# Patient Record
Sex: MALE | Race: WHITE | Employment: FULL TIME | URBAN - METROPOLITAN AREA
[De-identification: names, ages, dates, MRNs, and addresses within clinical notes are randomized per-mention and may not be internally consistent; named-entity substitution may affect disease eponyms.]

---

## 2022-02-26 ENCOUNTER — HOSPITAL ENCOUNTER (EMERGENCY)
Age: 32
Discharge: HOME | End: 2022-02-26
Attending: EMERGENCY MEDICINE
Payer: OTHER GOVERNMENT

## 2022-02-26 VITALS
RESPIRATION RATE: 14 BRPM | TEMPERATURE: 98.6 F | OXYGEN SATURATION: 96 % | SYSTOLIC BLOOD PRESSURE: 139 MMHG | DIASTOLIC BLOOD PRESSURE: 95 MMHG | BODY MASS INDEX: 30.31 KG/M2 | WEIGHT: 200 LBS | HEART RATE: 118 BPM | HEIGHT: 68 IN

## 2022-02-26 DIAGNOSIS — N34.2 URETHRITIS: ICD-10-CM

## 2022-02-26 DIAGNOSIS — Z20.2 POTENTIAL EXPOSURE TO STD: Primary | ICD-10-CM

## 2022-02-26 PROCEDURE — 87491 CHLMYD TRACH DNA AMP PROBE: CPT

## 2022-02-26 PROCEDURE — 96372 THER/PROPH/DIAG INJ SC/IM: CPT

## 2022-02-26 PROCEDURE — 87591 N.GONORRHOEAE DNA AMP PROB: CPT

## 2022-02-26 PROCEDURE — 6360000002 HC RX W HCPCS: Performed by: EMERGENCY MEDICINE

## 2022-02-26 PROCEDURE — 6370000000 HC RX 637 (ALT 250 FOR IP): Performed by: EMERGENCY MEDICINE

## 2022-02-26 PROCEDURE — 99284 EMERGENCY DEPT VISIT MOD MDM: CPT

## 2022-02-26 RX ORDER — CEFTRIAXONE SODIUM 250 MG/1
250 INJECTION, POWDER, FOR SOLUTION INTRAMUSCULAR; INTRAVENOUS ONCE
Status: COMPLETED | OUTPATIENT
Start: 2022-02-26 | End: 2022-02-26

## 2022-02-26 RX ORDER — AZITHROMYCIN 250 MG/1
1000 TABLET, FILM COATED ORAL ONCE
Status: COMPLETED | OUTPATIENT
Start: 2022-02-26 | End: 2022-02-26

## 2022-02-26 RX ADMIN — AZITHROMYCIN 1000 MG: 250 TABLET, FILM COATED ORAL at 15:09

## 2022-02-26 RX ADMIN — CEFTRIAXONE SODIUM 250 MG: 250 INJECTION, POWDER, FOR SOLUTION INTRAMUSCULAR; INTRAVENOUS at 15:09

## 2022-02-26 ASSESSMENT — ENCOUNTER SYMPTOMS
RESPIRATORY NEGATIVE: 1
GASTROINTESTINAL NEGATIVE: 1
EYES NEGATIVE: 1

## 2022-02-26 NOTE — ED PROVIDER NOTES
Activity:     Days of Exercise per Week: Not on file    Minutes of Exercise per Session: Not on file   Stress:     Feeling of Stress : Not on file   Social Connections:     Frequency of Communication with Friends and Family: Not on file    Frequency of Social Gatherings with Friends and Family: Not on file    Attends Buddhism Services: Not on file    Active Member of Clubs or Organizations: Not on file    Attends Club or Organization Meetings: Not on file    Marital Status: Not on file   Intimate Partner Violence:     Fear of Current or Ex-Partner: Not on file    Emotionally Abused: Not on file    Physically Abused: Not on file    Sexually Abused: Not on file   Housing Stability:     Unable to Pay for Housing in the Last Year: Not on file    Number of [place] in the Last Year: Not on file    Unstable Housing in the Last Year: Not on file          Review of Systems   Constitutional: Negative for activity change, appetite change, chills and fever. HENT: Negative. Eyes: Negative. Respiratory: Negative. Cardiovascular: Negative. Gastrointestinal: Negative. Endocrine: Negative for polydipsia and polyphagia. Genitourinary: Positive for dysuria and penile discharge. Negative for testicular pain. Musculoskeletal: Negative. Skin: Negative. Hematological: Does not bruise/bleed easily. Psychiatric/Behavioral: Negative for agitation and behavioral problems. Physical Exam  Constitutional:       Appearance: Normal appearance. He is normal weight. He is not ill-appearing. HENT:      Head: Normocephalic and atraumatic. Eyes:      Pupils: Pupils are equal, round, and reactive to light. Pulmonary:      Effort: Pulmonary effort is normal. No respiratory distress. Genitourinary:     Penis: Normal.       Testes: Normal.      Prostate: Normal.      Rectum: Normal.      Comments: No lesions or adenopathy. No appreciable penile discharge.    Musculoskeletal:         General: Normal range of motion. Cervical back: Normal range of motion. Skin:     General: Skin is warm and dry. Capillary Refill: Capillary refill takes less than 2 seconds. Neurological:      General: No focal deficit present. Mental Status: He is alert and oriented to person, place, and time. Psychiatric:         Mood and Affect: Mood normal.         Behavior: Behavior normal.          Procedures     MDM   No results found for this visit on 02/26/22. I estimate there is LOW risk for ACUTE APPENDICITIS, BOWEL OBSTRUCTION, CHOLECYSTITIS, DIVERTICULITIS, INCARCERATED HERNIA, PANCREATITIS, or PERFORATED BOWEL or ULCER, thus I consider the discharge disposition reasonable. Also, there is no evidence or peritonitis, sepsis, or toxicity. Mariana Doan and I have discussed the diagnosis and risks, and we agree with discharging home to follow-up with their primary doctor. We also discussed returning to the Emergency Department immediately if new or worsening symptoms occur. We have discussed the symptoms which are most concerning (e.g., bloody stool, fever, changing or worsening pain, vomiting) that necessitate immediate return. FINAL Impression    1. Potential exposure to STD    2. Urethritis        Blood pressure (!) 139/95, pulse 118, temperature 98.6 °F (37 °C), resp. rate 14, height 5' 8\" (1.727 m), weight 200 lb (90.7 kg), SpO2 96 %.        Camila Hand MD  02/26/22 [id number]

## 2022-02-26 NOTE — ED TRIAGE NOTES
Pt arrived via triage with concerns for STD. Pt reports painful urination and \"leaking'. Pt reports sexual encounter with unknown partner approx 7 days ago. Pt reports he is unable to ask that person. Pt is alert, oriented and ambulatory. Pt respirations are even and unlabored.

## 2022-03-01 LAB
CHLAMYDIA TRACHOMATIS AMPLIFIED DET: NEGATIVE
N GONORRHOEAE AMPLIFIED DET: NEGATIVE

## 2024-06-24 ENCOUNTER — HOSPITAL ENCOUNTER (EMERGENCY)
Age: 34
Discharge: HOME | End: 2024-06-24
Attending: STUDENT IN AN ORGANIZED HEALTH CARE EDUCATION/TRAINING PROGRAM
Payer: OTHER GOVERNMENT

## 2024-06-24 VITALS
OXYGEN SATURATION: 100 % | HEART RATE: 99 BPM | TEMPERATURE: 98 F | SYSTOLIC BLOOD PRESSURE: 155 MMHG | HEIGHT: 68 IN | WEIGHT: 200 LBS | RESPIRATION RATE: 18 BRPM | DIASTOLIC BLOOD PRESSURE: 93 MMHG | BODY MASS INDEX: 30.31 KG/M2

## 2024-06-24 DIAGNOSIS — R30.0 DYSURIA: Primary | ICD-10-CM

## 2024-06-24 LAB
BILIRUBIN, URINE: NEGATIVE MG/DL
BLOOD, URINE: NEGATIVE
CLARITY: CLEAR
COLOR: YELLOW
COMMENT UA: NORMAL
GLUCOSE URINE: NEGATIVE MG/DL
KETONES, URINE: NEGATIVE MG/DL
LEUKOCYTE ESTERASE, URINE: NEGATIVE
NITRITE URINE, QUANTITATIVE: NEGATIVE
PH, URINE: 6 (ref 5–8)
PROTEIN UA: NEGATIVE MG/DL
SPECIFIC GRAVITY UA: 1.02 (ref 1–1.03)
UROBILINOGEN, URINE: 0.2 MG/DL (ref 0.2–1)

## 2024-06-24 PROCEDURE — 99283 EMERGENCY DEPT VISIT LOW MDM: CPT

## 2024-06-24 PROCEDURE — 87591 N.GONORRHOEAE DNA AMP PROB: CPT

## 2024-06-24 PROCEDURE — 81003 URINALYSIS AUTO W/O SCOPE: CPT

## 2024-06-24 PROCEDURE — 87491 CHLMYD TRACH DNA AMP PROBE: CPT

## 2024-06-24 ASSESSMENT — PAIN - FUNCTIONAL ASSESSMENT: PAIN_FUNCTIONAL_ASSESSMENT: NONE - DENIES PAIN

## 2024-06-24 NOTE — ED PROVIDER NOTES
EMERGENCY DEPARTMENT HISTORY AND PHYSICAL EXAM      Patient Name: Sivakumar Galeas  MRN: [id number]  : 1990  Date of Evaluation: 2024  ED Provider:  Han Lewis DO    History of Presenting Illness     Chief Complaint:   Chief Complaint   Patient presents with    Dysuria     Burning with urination.. reports feeling like \"something is leaking, but its not\".     Exposure to STD           Cough       HPI: Patient is a 34 y.o. male presenting to the emergency department with a chief complaint of burning with urination.  Patient states he has intermittently noticed the symptoms for the last 2 weeks.  Patient states his last sexual intercourse was 1 month ago with his wife.  Patient states his wife has been having UTI-like symptoms and he also just recently discovered that she was having an affair.  Patient denies fevers, chills, any significant skin findings, abdominal pain, discharge, and denies any testicular pain.          Past History     Past Medical History: History reviewed. No pertinent past medical history.  Surgical History: History reviewed. No pertinent surgical history.  Family History: History reviewed. No pertinent family history.  Social History:   Social History     Socioeconomic History    Marital status: Single     Spouse name: Not on file    Number of children: Not on file    Years of education: Not on file    Highest education level: Not on file   Occupational History    Not on file   Tobacco Use    Smoking status: Never     Passive exposure: Never    Smokeless tobacco: Never   Substance and Sexual Activity    Alcohol use: Not Currently    Drug use: Not Currently    Sexual activity: Not on file   Other Topics Concern    Not on file   Social History Narrative    Not on file     Social Determinants of Health     Financial Resource Strain: Not on file   Food Insecurity: Not on file   Transportation Needs: Not on file   Physical Activity: Not on file   Stress: Not on file   Social

## 2024-06-24 NOTE — ED NOTES
Pt performed urethral self swab,    Discharge instructions reviewed with patient.. Patient denies further questions and verbalizes understanding

## 2024-06-27 LAB
C TRACH RRNA SPEC QL NAA+PROBE: NEGATIVE
N GONORRHOEA RRNA SPEC QL NAA+PROBE: NEGATIVE